# Patient Record
Sex: FEMALE | Race: WHITE | NOT HISPANIC OR LATINO | Employment: FULL TIME | URBAN - METROPOLITAN AREA
[De-identification: names, ages, dates, MRNs, and addresses within clinical notes are randomized per-mention and may not be internally consistent; named-entity substitution may affect disease eponyms.]

---

## 2024-03-20 ENCOUNTER — APPOINTMENT (OUTPATIENT)
Dept: OBSTETRICS AND GYNECOLOGY | Facility: CLINIC | Age: 24
End: 2024-03-20
Payer: COMMERCIAL

## 2024-03-22 ENCOUNTER — OFFICE VISIT (OUTPATIENT)
Dept: OBSTETRICS AND GYNECOLOGY | Facility: CLINIC | Age: 24
End: 2024-03-22
Payer: COMMERCIAL

## 2024-03-22 VITALS
SYSTOLIC BLOOD PRESSURE: 116 MMHG | WEIGHT: 180.8 LBS | HEIGHT: 64 IN | DIASTOLIC BLOOD PRESSURE: 72 MMHG | BODY MASS INDEX: 30.87 KG/M2

## 2024-03-22 DIAGNOSIS — Z01.419 WELL FEMALE EXAM WITH ROUTINE GYNECOLOGICAL EXAM: Primary | ICD-10-CM

## 2024-03-22 PROCEDURE — 88175 CYTOPATH C/V AUTO FLUID REDO: CPT

## 2024-03-22 PROCEDURE — 1036F TOBACCO NON-USER: CPT | Performed by: OBSTETRICS & GYNECOLOGY

## 2024-03-22 PROCEDURE — 99385 PREV VISIT NEW AGE 18-39: CPT | Performed by: OBSTETRICS & GYNECOLOGY

## 2024-03-22 ASSESSMENT — PATIENT HEALTH QUESTIONNAIRE - PHQ9
1. LITTLE INTEREST OR PLEASURE IN DOING THINGS: NOT AT ALL
2. FEELING DOWN, DEPRESSED OR HOPELESS: NOT AT ALL
SUM OF ALL RESPONSES TO PHQ9 QUESTIONS 1 & 2: 0

## 2024-03-22 NOTE — PROGRESS NOTES
"NPV, Annual Exam    Pap: 2019 nml per pt  Arsalan:  Never  LMP: 2024  CC: Trying to conceive     Simin Wilkins MA II     GYN ANNUAL EXAM    SUBJECTIVE    Farrah Gardner \"Allshandra\" is a 24 y.o. female who presents for annual exam today.  Her periods are monthly and fairly regular.  Her cycle length ranges from 27 to 31 days.  She is actively trying to conceive and has been since September.  She has no complaints today.      PMH - asthma      PSH - ACL surgery     OB history -   OB History    Para Term  AB Living   0 0 0 0 0 0   SAB IAB Ectopic Multiple Live Births   0 0 0 0 0     Last pap - unsure, denies h/o abnormal paps     Last mammogram - not yet indicated     Family history of breast or ovarian cancer - None     OBJECTIVE  /72   Ht 1.626 m (5' 4\")   Wt 82 kg (180 lb 12.8 oz)   LMP 2024 (Exact Date)   BMI 31.03 kg/m²     General Appearance   - consistent with stated age, well groomed and cooperative    Integumentary  - skin warm and dry without rash    Head and Neck  - normalocephalic and neck supple    Chest and Lung Exam  - normal breathing effort, no respiratory distress    Breast  - symmetry noted, no mass palpable, no skin change and no nipple discharge.    Abdomen  - soft, nontender and no hepatomegaly, splenomegaly, or mass    Female Genitourinary  - vulva normal without rash or lesion, normal vaginal rugae, no vaginal discharge, uterus normal size & no palpable masses, no adnexal mass, no adnexal tenderness, no cervical motion tenderness    Peripheral Vascular  - no edema present    ASSESSMENT/PLAN  24 y.o.  female who presents for annual exam.       Actions performed during this visit include:  - Clinical breast exam  - Clinical pelvic exam  - Pap- Pap sent today   - Mammogram- not yet indicated   - Contraception- Declines.  Actively trying to conceive.  Discussed start PNV and she will start.   - STI screening  - Declines     Please return for your next visit in 1 " year or sooner as needed.     Jennifer Millan MD

## 2024-04-01 LAB
CYTOLOGY CMNT CVX/VAG CYTO-IMP: NORMAL
LAB AP HPV GENOTYPE QUESTION: NO
LAB AP HPV HR: NORMAL
LABORATORY COMMENT REPORT: NORMAL
PATH REPORT.TOTAL CANCER: NORMAL

## 2024-07-31 ENCOUNTER — OFFICE VISIT (OUTPATIENT)
Dept: OBSTETRICS AND GYNECOLOGY | Facility: CLINIC | Age: 24
End: 2024-07-31
Payer: COMMERCIAL

## 2024-07-31 VITALS — DIASTOLIC BLOOD PRESSURE: 83 MMHG | BODY MASS INDEX: 30.07 KG/M2 | WEIGHT: 175.2 LBS | SYSTOLIC BLOOD PRESSURE: 126 MMHG

## 2024-07-31 DIAGNOSIS — Z78.9 ATTEMPTING TO CONCEIVE: Primary | ICD-10-CM

## 2024-07-31 PROCEDURE — 1036F TOBACCO NON-USER: CPT

## 2024-07-31 PROCEDURE — 99214 OFFICE O/P EST MOD 30 MIN: CPT

## 2024-07-31 NOTE — PROGRESS NOTES
"Subjective   Patient ID: Farrah Gardner \"Angela\" is a 24 y.o. female who presents for attempting to conceive (Has been trying for 1 year to conceive. Aug is one year trying./Tracking ovulation:  no, per KK sex every day 1-10 after period, then every other til ovulation/LMP: 6/27/24, 3-4 days long/Cycles: normal til 2 months ago/Bleeding lasting for 3-4 days/Hcg test at home this morning- neg/Pap: 3/22/2024 nml).    HPI  Patient presents to the office today with concern for infertility.  States that she and her  have been trying to get pregnancy since September of 2023.  Menstrual cycles are 29-36 days.  She uses Rick armen.  Having intercourse daily from day 4-10 of her cycle, and then every other day from day 10-14, and then stops having intercourse.  Taking daily PNV.      States that her mother had a history of endometriosis.    Having anxiety that there may be a problem with either her or her .    Currently 3 days late for period, though has had multiple negative at home pregnancy tests.  Negative test this AM.     Review of Systems   All other systems reviewed and are negative.      Objective   Physical Exam  Constitutional:       Appearance: Normal appearance.   HENT:      Head: Normocephalic.   Pulmonary:      Effort: Pulmonary effort is normal.   Skin:     General: Skin is warm and dry.   Neurological:      Mental Status: She is alert and oriented to person, place, and time.   Psychiatric:         Mood and Affect: Mood normal.         Assessment/Plan   Diagnoses and all orders for this visit:  Attempting to conceive  -     Referral to Reproductive Endocrinology; Future    Discussed menstrual cycle and timing of intercourse; recommended intercourse every other day from day 9-21 based on cycle length.  Also recommended Vitamin D supplementation.  DAMASO referral placed.    Patient reports understanding, all questions answered at this time.    F/U as needed.     EVANS Elder-DAMON 07/31/24 " 2:40 PM

## 2024-08-28 ENCOUNTER — APPOINTMENT (OUTPATIENT)
Dept: OBSTETRICS AND GYNECOLOGY | Facility: CLINIC | Age: 24
End: 2024-08-28
Payer: COMMERCIAL

## 2024-08-29 ENCOUNTER — APPOINTMENT (OUTPATIENT)
Dept: OBSTETRICS AND GYNECOLOGY | Facility: CLINIC | Age: 24
End: 2024-08-29
Payer: COMMERCIAL

## 2024-10-11 ENCOUNTER — PATIENT MESSAGE (OUTPATIENT)
Dept: ENDOCRINOLOGY | Facility: CLINIC | Age: 24
End: 2024-10-11
Payer: COMMERCIAL

## 2024-10-13 ASSESSMENT — LIFESTYLE VARIABLES
TOBACCO_USE: NO
HISTORY_ALCOHOL_USE: NO
TOBACCO_USE: NO
HISTORY_ALCOHOL_USE: NO

## 2024-10-15 ENCOUNTER — CONSULT (OUTPATIENT)
Dept: ENDOCRINOLOGY | Facility: CLINIC | Age: 24
End: 2024-10-15
Payer: COMMERCIAL

## 2024-10-15 DIAGNOSIS — Z11.59 ENCOUNTER FOR SCREENING FOR OTHER VIRAL DISEASES: ICD-10-CM

## 2024-10-15 DIAGNOSIS — Z01.812 ENCOUNTER FOR PREPROCEDURAL LABORATORY EXAMINATION: ICD-10-CM

## 2024-10-15 DIAGNOSIS — Z01.83 ENCOUNTER FOR RH BLOOD TYPING: ICD-10-CM

## 2024-10-15 DIAGNOSIS — Z31.41 FERTILITY TESTING: Primary | ICD-10-CM

## 2024-10-15 DIAGNOSIS — Z11.3 SCREENING FOR STDS (SEXUALLY TRANSMITTED DISEASES): ICD-10-CM

## 2024-10-15 DIAGNOSIS — Z78.9 ATTEMPTING TO CONCEIVE: ICD-10-CM

## 2024-10-15 PROCEDURE — 99214 OFFICE O/P EST MOD 30 MIN: CPT | Performed by: OBSTETRICS & GYNECOLOGY

## 2024-10-15 RX ORDER — ACETAMINOPHEN 500 MG
2000 TABLET ORAL DAILY
COMMUNITY

## 2024-10-15 RX ORDER — ACETAMINOPHEN AND PHENYLEPHRINE HCL 325; 5 MG/1; MG/1
10000 TABLET ORAL
COMMUNITY

## 2024-10-15 ASSESSMENT — PATIENT HEALTH QUESTIONNAIRE - PHQ9
2. FEELING DOWN, DEPRESSED OR HOPELESS: NOT AT ALL
1. LITTLE INTEREST OR PLEASURE IN DOING THINGS: NOT AT ALL
SUM OF ALL RESPONSES TO PHQ9 QUESTIONS 1 AND 2: 0

## 2024-10-15 ASSESSMENT — COLUMBIA-SUICIDE SEVERITY RATING SCALE - C-SSRS
6. HAVE YOU EVER DONE ANYTHING, STARTED TO DO ANYTHING, OR PREPARED TO DO ANYTHING TO END YOUR LIFE?: NO
1. IN THE PAST MONTH, HAVE YOU WISHED YOU WERE DEAD OR WISHED YOU COULD GO TO SLEEP AND NOT WAKE UP?: NO
2. HAVE YOU ACTUALLY HAD ANY THOUGHTS OF KILLING YOURSELF?: NO

## 2024-10-15 NOTE — PROGRESS NOTES
"  Visit Type: In Person    NEW FERTILITY PATIENT VISIT    Referred by: Margaret hoskins    Accompanied today by: Soto Gardner-        Farrah Gardner \"Angela\" is a 24 y.o.  female who presents with primary infertility x 1 year.    Have you had any concerns about your fertility treatments so far? No, we are just starting our process     What are you goals for today's visit? To get a better understanding of what is happening with our process and to get a better understanding of the process     What causes of infertility have been identified on your workup so far? We are both 24 and have been trying for now over a year with no positive pregnancy test in that time. I have family history of endometriosis and ovarian cancer.     Past Infertility Treatments: No      Please summarize your fertility treatments to date.       As far as you are aware, do you have insurance coverage for fertility diagnostic testing and/or fertility treatments? Yes      PRIOR EVALUATION / TREATMENT    Relationship Status:      Have you ever been pregnant? No    How many times have you been pregnant?    Have you ever had a miscarriage? No    How many times have you had a miscarriage?       OB Hx     OB History          0    Para   0    Term   0       0    AB   0    Living   0         SAB   0    IAB   0    Ectopic   0    Multiple   0    Live Births   0                 GYN HISTORY   Have you ever been diagnosed with a sexually transmitted disease? No    Please select all that are applicable:    Have you ever had Pelvic Inflammatory Disease? No    Have you had an abnormal PAP smear? No    Date & Result of last PAP smear:   Lab Results   Component Value Date    FINALINTERP  2024         A. THINPREP PAP CERVIX, SCREENING -     Specimen Adequacy  Satisfactory for evaluation; endocervical/transformation zone component is present  Quality Indicator: Partially obscuring inflammation    General Categorization  Negative for " intraepithelial lesion or malignancy.    Descriptive Interpretation  Negative for intraepithelial lesion or malignancy      Specimen does not meet the requisition-stated criteria for HPV testing. See Pap test interpretation above.          Have you ever had an abnormal Mammogram? No    Date & result of your last mammogram:    Do you have pelvic pain? No    How many times per week do you have intercourse? We are having sex every other day from day 9 to day 22    Do you have pain with intercourse? No    Do you use lubricants with intercourse? None    Do you have pain with bowel movements? No              Do you have pain with a full bladder? No    MENSTRUAL HISTORY  LMP: Other    Menarche: 12-13 years old    Contraception: None    Cycle length: 30    Describe your bleeding: Average    Dysmenorrhea: Yes       ENDOCRINE/INFERTILITY HISTORY  Duration of infertility: 1 year    Coital Activity/week: We are having sex every other day from day 9 to day 22    Nipple Discharge: No    Vision changes: No    Headaches: No    Excess hair growth: No    Excessive hair loss: Yes    Acne: Yes    Oily skin: Yes    Recent weight change  Weight gain: No    Weight loss: No    Exercise more than 3 times a week: No      PMH  Past Medical History:   Diagnosis Date    Asthma         MEDICATIONS  Current Outpatient Medications on File Prior to Visit   Medication Sig Dispense Refill    biotin 10,000 mcg capsule Take 10,000 capsules (100,000 mg) by mouth.      cholecalciferol (Vitamin D3) 50 mcg (2,000 unit) capsule Take 1 capsule (50 mcg) by mouth once daily.      prenatal vit37/iron/folic acid (PRENATA ORAL) Take by mouth.       No current facility-administered medications on file prior to visit.        PSH  Past Surgical History:   Procedure Laterality Date    ANTERIOR CRUCIATE LIGAMENT REPAIR Right     KNEE OPEN LATERAL RELEASE Right         PSYCH HISTORY  Have you ever been diagnosed with a mental health Issue? No    Have you ever been  hospitalized for a mental health disorder? No       SOCIAL HISTORY  Social History     Tobacco Use    Smoking status: Never     Passive exposure: Never    Smokeless tobacco: Never   Vaping Use    Vaping status: Never Used   Substance Use Topics    Alcohol use: Yes     Alcohol/week: 2.0 standard drinks of alcohol     Types: 1 Glasses of wine, 1 Standard drinks or equivalent per week     Comment: More per month    Drug use: Never     Occupation:      Have you ever been incarcerated? No    Do you have a history of domestic violence? No    Do you feel safe at home? Yes    Do you have a history of any negative sexual experience such as incest or rape? No       PARTNER HISTORY  Partner Name: Soto Gardner    Partner : [id number]    Partner email: [email or web address]    Occupation: Supply chain     Prior fertility history: None    PMH: UTI-    PSH: Reconstructed ear drum:     Smoking:No    Alcohol Use: No    Drug Use: No    Medications: N/a    Injuries: Yes    STD: No    Please select all that are applicable:    SA: No    SA Results:      FAMILY HISTORY  Family History   Problem Relation Name Age of Onset    Endometriosis Mother      Other (cervix cancer) Maternal Grandmother         CANCER HISTORY    Breast: Unsure    Ovarian: No    Colon: No    Endometrial: No      FAMILY VTE HISTORY  Family History of Blood Clots: No      GENETIC HISTORY  Ethnic Background  Patient: White    Partner: White    Genetic Disease in Family  Patient: No    Partner: No    Birth Defects in Family  Patient: No    Partner: No    Genetic screening performed previously:       BMI:   BMI Readings from Last 1 Encounters:   24 30.07 kg/m²     VITALS:  There were no vitals taken for this visit.    GEN: alert and oriented, cooperative, no distress, appears stated age  Psych:  Exhibits appropriate mood and judgement.  HEENT: NC/AT  Resp: Normal respiratory effort, no use of accessory  muscles  Ext: No clubbing, cyanosis, or edema      ASSESSMENT   Farrah is a 24 y.o.  female with  primary infertility x 1 year.  She desires to proceed with fertility testing.    Partner SA:  ordered    COUNSELING  We discussed causes of infertility including hormonal, egg quality issues, structural problems such as endometriosis, adhesions, or tubal problems, uterine factors such as polyps or fibroids, and sperm issues. Reviewed evaluation of such as well. We discussed various methods for achieving pregnancy in some detail including, ovulation induction, insemination, superovulation and IVF.        Routine Testing  Fertility Center  STDs Within 1 year   Genetic carrier Waiver/Completed   T&S Within 1 year   AMH Within 1 year   TSH Within 1 year   Rubella/Varicella Within 5 years     PLAN  Orders Placed This Encounter   Procedures    Hysterosalpingogram (HSG)    US pelvis transvaginal    FL hysterosalpingogram    Antimullerian Hormone (Amh)    TSH with reflex to Free T4 if abnormal    Type And Screen    Rubella Antibody, Igg    Varicella Zoster Antibody, Igg    Hepatitis B surface antigen    Hepatitis C Antibody    HIV 1/2 Antigen/Antibody Screen with Reflex to Confirmation    Syphilis Screen with Reflex    POCT pregnancy, urine manually resulted       GENETIC SCREENING PATIENT  Will consider    PARTNER  Yes Semen Analysis: Ordered  Yes Genetic screening: will consider    FOLLOW UP   Consults: Financial consult  Chart to primary nurse for care coordination and patient check list/education  Enroll in Engaged MD  Take prenatal vitamins, vitamin D 2000 IUs daily  Discussed that pap and mammogram must be updated per ACOG guidelines before treatment can begin  Discussed that treatment cannot proceed until checklist items are complete   6 week follow up with Any provider  Additional testing for BMI < 18 or > 40: NA  Sperm Donor:      MD Completion:  Ectopic Risk: No  Medically Complex: No    Fertility Plan  Update: complete testing, follow up to discuss options    Arden Morrell  10/15/2024  8:14 AM

## 2024-10-15 NOTE — LETTER
"October 15, 2024     [facility]  [address]  [address]  [facility] [address]    Patient: Angela Gardner   YOB: 2000   Date of Visit: 10/15/2024       Dear ARI Graves:    Thank you for referring Angela Gardner to me for evaluation. Below are my notes for this consultation.  If you have questions, please do not hesitate to call me. I look forward to following your patient along with you.       Sincerely,     Arden Morrell MD      CC: No Recipients  ______________________________________________________________________________________      Visit Type: In Person    NEW FERTILITY PATIENT VISIT    Referred by: Margaret hoskins    Accompanied today by: Soto Jj-        Farrah Gardner \"Vadim" is a 24 y.o.  female who presents with primary infertility x 1 year.    Have you had any concerns about your fertility treatments so far? No, we are just starting our process     What are you goals for today's visit? To get a better understanding of what is happening with our process and to get a better understanding of the process     What causes of infertility have been identified on your workup so far? We are both 24 and have been trying for now over a year with no positive pregnancy test in that time. I have family history of endometriosis and ovarian cancer.     Past Infertility Treatments: No      Please summarize your fertility treatments to date.       As far as you are aware, do you have insurance coverage for fertility diagnostic testing and/or fertility treatments? Yes      PRIOR EVALUATION / TREATMENT    Relationship Status:      Have you ever been pregnant? No    How many times have you been pregnant?    Have you ever had a miscarriage? No    How many times have you had a miscarriage?       OB Hx     OB History          0    Para   0    Term   0       0    AB   0    Living   0         SAB   0    IAB   0    Ectopic   0    Multiple "   0    Live Births   0                 GYN HISTORY   Have you ever been diagnosed with a sexually transmitted disease? No    Please select all that are applicable:    Have you ever had Pelvic Inflammatory Disease? No    Have you had an abnormal PAP smear? No    Date & Result of last PAP smear:   Lab Results   Component Value Date    FINALINTERP  03/22/2024         A. THINPREP PAP CERVIX, SCREENING -     Specimen Adequacy  Satisfactory for evaluation; endocervical/transformation zone component is present  Quality Indicator: Partially obscuring inflammation    General Categorization  Negative for intraepithelial lesion or malignancy.    Descriptive Interpretation  Negative for intraepithelial lesion or malignancy      Specimen does not meet the requisition-stated criteria for HPV testing. See Pap test interpretation above.          Have you ever had an abnormal Mammogram? No    Date & result of your last mammogram:    Do you have pelvic pain? No    How many times per week do you have intercourse? We are having sex every other day from day 9 to day 22    Do you have pain with intercourse? No    Do you use lubricants with intercourse? None    Do you have pain with bowel movements? No              Do you have pain with a full bladder? No    MENSTRUAL HISTORY  LMP: Other    Menarche: 12-13 years old    Contraception: None    Cycle length: 30    Describe your bleeding: Average    Dysmenorrhea: Yes       ENDOCRINE/INFERTILITY HISTORY  Duration of infertility: 1 year    Coital Activity/week: We are having sex every other day from day 9 to day 22    Nipple Discharge: No    Vision changes: No    Headaches: No    Excess hair growth: No    Excessive hair loss: Yes    Acne: Yes    Oily skin: Yes    Recent weight change  Weight gain: No    Weight loss: No    Exercise more than 3 times a week: No      PMH  Past Medical History:   Diagnosis Date   • Asthma         MEDICATIONS  Current Outpatient Medications on File Prior to Visit    Medication Sig Dispense Refill   • biotin 10,000 mcg capsule Take 10,000 capsules (100,000 mg) by mouth.     • cholecalciferol (Vitamin D3) 50 mcg (2,000 unit) capsule Take 1 capsule (50 mcg) by mouth once daily.     • prenatal vit37/iron/folic acid (PRENATA ORAL) Take by mouth.       No current facility-administered medications on file prior to visit.        PSH  Past Surgical History:   Procedure Laterality Date   • ANTERIOR CRUCIATE LIGAMENT REPAIR Right    • KNEE OPEN LATERAL RELEASE Right         PSYCH HISTORY  Have you ever been diagnosed with a mental health Issue? No    Have you ever been hospitalized for a mental health disorder? No       SOCIAL HISTORY  Social History     Tobacco Use   • Smoking status: Never     Passive exposure: Never   • Smokeless tobacco: Never   Vaping Use   • Vaping status: Never Used   Substance Use Topics   • Alcohol use: Yes     Alcohol/week: 2.0 standard drinks of alcohol     Types: 1 Glasses of wine, 1 Standard drinks or equivalent per week     Comment: More per month   • Drug use: Never     Occupation:      Have you ever been incarcerated? No    Do you have a history of domestic violence? No    Do you feel safe at home? Yes    Do you have a history of any negative sexual experience such as incest or rape? No       PARTNER HISTORY  Partner Name: Soto Gardner    Partner : [id number]    Partner email: [email or web address]    Occupation: Supply chain     Prior fertility history: None    PMH: UTI-    PSH: Reconstructed ear drum:     Smoking:No    Alcohol Use: No    Drug Use: No    Medications: N/a    Injuries: Yes    STD: No    Please select all that are applicable:    SA: No    SA Results:      FAMILY HISTORY  Family History   Problem Relation Name Age of Onset   • Endometriosis Mother     • Other (cervix cancer) Maternal Grandmother         CANCER HISTORY    Breast: Unsure    Ovarian: No    Colon: No    Endometrial:  No      FAMILY VTE HISTORY  Family History of Blood Clots: No      GENETIC HISTORY  Ethnic Background  Patient: White    Partner: White    Genetic Disease in Family  Patient: No    Partner: No    Birth Defects in Family  Patient: No    Partner: No    Genetic screening performed previously:       BMI:   BMI Readings from Last 1 Encounters:   24 30.07 kg/m²     VITALS:  There were no vitals taken for this visit.    GEN: alert and oriented, cooperative, no distress, appears stated age  Psych:  Exhibits appropriate mood and judgement.  HEENT: NC/AT  Resp: Normal respiratory effort, no use of accessory muscles  Ext: No clubbing, cyanosis, or edema      ASSESSMENT   Farrah is a 24 y.o.  female with  primary infertility x 1 year.  She desires to proceed with fertility testing.    Partner SA:  ordered    COUNSELING  We discussed causes of infertility including hormonal, egg quality issues, structural problems such as endometriosis, adhesions, or tubal problems, uterine factors such as polyps or fibroids, and sperm issues. Reviewed evaluation of such as well. We discussed various methods for achieving pregnancy in some detail including, ovulation induction, insemination, superovulation and IVF.        Routine Testing  Fertility Center  STDs Within 1 year   Genetic carrier Waiver/Completed   T&S Within 1 year   AMH Within 1 year   TSH Within 1 year   Rubella/Varicella Within 5 years     PLAN  Orders Placed This Encounter   Procedures   • Hysterosalpingogram (HSG)   • US pelvis transvaginal   • FL hysterosalpingogram   • Antimullerian Hormone (Amh)   • TSH with reflex to Free T4 if abnormal   • Type And Screen   • Rubella Antibody, Igg   • Varicella Zoster Antibody, Igg   • Hepatitis B surface antigen   • Hepatitis C Antibody   • HIV 1/2 Antigen/Antibody Screen with Reflex to Confirmation   • Syphilis Screen with Reflex   • POCT pregnancy, urine manually resulted       GENETIC SCREENING PATIENT  Will  consider    PARTNER  Yes Semen Analysis: Ordered  Yes Genetic screening: will consider    FOLLOW UP   Consults: Financial consult  Chart to primary nurse for care coordination and patient check list/education  Enroll in Engaged MD  Take prenatal vitamins, vitamin D 2000 IUs daily  Discussed that pap and mammogram must be updated per ACOG guidelines before treatment can begin  Discussed that treatment cannot proceed until checklist items are complete   6 week follow up with Any provider  Additional testing for BMI < 18 or > 40: NA  Sperm Donor:      MD Completion:  Ectopic Risk: No  Medically Complex: No    Fertility Plan Update: complete testing, follow up to discuss options    Arden Morrell  10/15/2024  8:14 AM

## 2024-11-07 ENCOUNTER — LAB (OUTPATIENT)
Dept: LAB | Facility: LAB | Age: 24
End: 2024-11-07
Payer: COMMERCIAL

## 2024-11-07 ENCOUNTER — ANCILLARY PROCEDURE (OUTPATIENT)
Dept: ENDOCRINOLOGY | Facility: CLINIC | Age: 24
End: 2024-11-07
Payer: COMMERCIAL

## 2024-11-07 DIAGNOSIS — Z31.41 FERTILITY TESTING: ICD-10-CM

## 2024-11-07 DIAGNOSIS — Z11.59 ENCOUNTER FOR SCREENING FOR OTHER VIRAL DISEASES: ICD-10-CM

## 2024-11-07 DIAGNOSIS — Z11.3 SCREENING FOR STDS (SEXUALLY TRANSMITTED DISEASES): ICD-10-CM

## 2024-11-07 DIAGNOSIS — Z01.83 ENCOUNTER FOR RH BLOOD TYPING: ICD-10-CM

## 2024-11-07 LAB
ABO GROUP (TYPE) IN BLOOD: NORMAL
ANTIBODY SCREEN: NORMAL
HBV SURFACE AG SERPL QL IA: NONREACTIVE
HCV AB SER QL: NONREACTIVE
HIV 1+2 AB+HIV1 P24 AG SERPL QL IA: NONREACTIVE
RH FACTOR (ANTIGEN D): NORMAL
RUBV IGG SERPL IA-ACNC: 2.6 IA
RUBV IGG SERPL QL IA: POSITIVE
TREPONEMA PALLIDUM IGG+IGM AB [PRESENCE] IN SERUM OR PLASMA BY IMMUNOASSAY: NONREACTIVE
TSH SERPL-ACNC: 1.52 MIU/L (ref 0.44–3.98)
VARICELLA ZOSTER IGG INDEX: 0.3 IA
VZV IGG SER QL IA: NEGATIVE

## 2024-11-07 PROCEDURE — 87389 HIV-1 AG W/HIV-1&-2 AB AG IA: CPT

## 2024-11-07 PROCEDURE — 76830 TRANSVAGINAL US NON-OB: CPT

## 2024-11-07 PROCEDURE — 86803 HEPATITIS C AB TEST: CPT

## 2024-11-07 PROCEDURE — 83516 IMMUNOASSAY NONANTIBODY: CPT

## 2024-11-07 PROCEDURE — 86787 VARICELLA-ZOSTER ANTIBODY: CPT

## 2024-11-07 PROCEDURE — 36415 COLL VENOUS BLD VENIPUNCTURE: CPT

## 2024-11-07 PROCEDURE — 86317 IMMUNOASSAY INFECTIOUS AGENT: CPT

## 2024-11-07 PROCEDURE — 86900 BLOOD TYPING SEROLOGIC ABO: CPT

## 2024-11-07 PROCEDURE — 76830 TRANSVAGINAL US NON-OB: CPT | Performed by: STUDENT IN AN ORGANIZED HEALTH CARE EDUCATION/TRAINING PROGRAM

## 2024-11-07 PROCEDURE — 86901 BLOOD TYPING SEROLOGIC RH(D): CPT

## 2024-11-07 PROCEDURE — 87340 HEPATITIS B SURFACE AG IA: CPT

## 2024-11-07 PROCEDURE — 84443 ASSAY THYROID STIM HORMONE: CPT

## 2024-11-07 PROCEDURE — 86850 RBC ANTIBODY SCREEN: CPT

## 2024-11-07 PROCEDURE — 86780 TREPONEMA PALLIDUM: CPT

## 2024-11-11 LAB — MIS SERPL-MCNC: 11.26 NG/ML (ref 0.4–16.02)

## 2024-11-27 ENCOUNTER — TELEMEDICINE (OUTPATIENT)
Dept: ENDOCRINOLOGY | Facility: CLINIC | Age: 24
End: 2024-11-27
Payer: COMMERCIAL

## 2024-11-27 VITALS — WEIGHT: 175 LBS | HEIGHT: 64 IN | BODY MASS INDEX: 29.88 KG/M2

## 2024-11-27 DIAGNOSIS — Z31.41 FERTILITY TESTING: Primary | ICD-10-CM

## 2024-11-27 PROCEDURE — 3008F BODY MASS INDEX DOCD: CPT | Performed by: OBSTETRICS & GYNECOLOGY

## 2024-11-27 PROCEDURE — 1036F TOBACCO NON-USER: CPT | Performed by: OBSTETRICS & GYNECOLOGY

## 2024-11-27 PROCEDURE — 99214 OFFICE O/P EST MOD 30 MIN: CPT | Performed by: OBSTETRICS & GYNECOLOGY

## 2024-11-27 ASSESSMENT — PAIN SCALES - GENERAL: PAINLEVEL_OUTOF10: 0-NO PAIN

## 2024-11-27 NOTE — PROGRESS NOTES
Virtual or Telephone Consent: An interactive audio and video telecommunication system which permits real time communications between the patient (at the originating site) and provider (at the distant site) was utilized to provide this telehealth service    Follow Up Visit JULIANE Yan is a 24 y.o.  female presenting today for follow up visit.  Results of the patient's fertility testing are as seen below.    Testing to date:   Component      Latest Ref Rng 2024   ABO TYPE B    Rh Type POS    ANTIBODY SCREEN NEG    Rubella, IgG      Negative  Positive    Rubella, IgG Index      <=0.7 IA IA 2.6    Varicella Zoster, IgG      Negative  Negative    Varicella Zoster, IgG Index      <=0.8 IA 0.3    Anti-Mullerian Hormone      0.401 - 16.015 ng/mL 11.263    Thyroid Stimulating Hormone      0.44 - 3.98 mIU/L 1.52    Hepatitis B Surface AG      Nonreactive  Nonreactive    Hepatitis C AB      Nonreactive  Nonreactive    HIV 1/2 Antigen/Antibody Screen with Reflex to Confirmation      Nonreactive  Nonreactive    Syphilis Total Ab      Nonreactive  Nonreactive      HSG has not yet been completed    This couple would like to hold off on genetic testing, declined testing at this time    GYN Pelvic Ultrasound: US PELVIS TRANSVAGINAL (2024): Unremarkable pelvic survey. The ovaries are noted to have numerous small physiologic peripheral follicles, consistent with possible polycystic ovarian syndrome.  Transabdominal images were acquired in addition to transvaginal images for optimal visualization of pelvic structures.    Partner SA:  Soto Gardner 98  Component      Latest Ref Rng 10/18/2024   Volume (Semen)      >=1.5 mL 2.90    Concentration(Semen)      >=15 mill/mL 59.85    Total Motility (Semen)      >=40 % 38 !    Prog. Motility (Semen)      >=32 % 31 !    Non Prog. Motility (Semen)      % 8    Total No of Sperm (Semen)      >=39 mill 173.57    Total No of Motile (Semen)      mill 66.55    Total No of  "Rnd Cells (Semen)      <=5 mill 2.6    Leukocyte (Semen) Negative See Separate Report    % Normal (Semen)      >=4 % 3.5 !           Past Medical History:   Diagnosis Date    Asthma      Past Surgical History:   Procedure Laterality Date    ANTERIOR CRUCIATE LIGAMENT REPAIR Right     KNEE OPEN LATERAL RELEASE Right      Current Outpatient Medications on File Prior to Visit   Medication Sig Dispense Refill    biotin 10,000 mcg capsule Take 10,000 capsules (100,000 mg) by mouth.      cholecalciferol (Vitamin D3) 50 mcg (2,000 unit) capsule Take 1 capsule (50 mcg) by mouth once daily.      prenatal vit37/iron/folic acid (PRENATA ORAL) Take by mouth.       No current facility-administered medications on file prior to visit.       BMI:   BMI Readings from Last 1 Encounters:   24 30.04 kg/m²     VITALS:  Ht 1.626 m (5' 4\")   Wt 79.4 kg (175 lb)   LMP 10/30/2024 (Exact Date)   BMI 30.04 kg/m²   LMP: Patient's last menstrual period was 10/30/2024 (exact date).    This is a telehealth visit    ASSESSMENT   Farrah is a 24 y.o.  female who presents today for follow-up of fertility testing.  Results of this couples testing were discussed at length, and all questions were answered to their satisfaction.  Unfortunately the patient was unable to complete HSG due to available testing slots, and will contact our office with her next menstrual period to complete this test.  Once HSG is complete, this couple is to follow-up with me to discuss options and next steps.  We briefly discussed possibility of proceeding with ovulation induction/IUI.  Patient reports that ovulation predictor kits are not reliable for her, therefore we will likely plan on OI/US/hCG/IUI if tubes are found to be patent.      Routine Testing  Fertility Center  STDs Within 1 year   Genetic carrier Waiver/Completed   T&S Within 1 year   AMH Within 1 year   TSH Within 1 year   Rubella/Varicella Within 5 years     BMI Testing  Fertility " Center  CBC Within 1 year   CMP Within 1 year   HgbA1c Within 1 year   Mag, Phos, Vit D <18 Within 1 year   MFM > 40  REQ   Wt loss consult > 40 OPT     PLAN  [X ] Patient to complete HSG, follow-up to discuss options and next steps      FOLLOW UP   Consults: Financial consult  Engaged MD  Take prenatal vitamins, vitamin D 2000 IUs daily  Discussed that treatment cannot proceed until checklist items are complete.   Additional testing for BMI < 18 or > 40: NA.  Follow-up once workup is complete  Chart to primary nurse for care coordination and patient check list/education.      Arden Morrell  11/27/2024  11:38 AM

## 2024-12-09 ENCOUNTER — TELEPHONE (OUTPATIENT)
Dept: ENDOCRINOLOGY | Facility: CLINIC | Age: 24
End: 2024-12-09

## 2024-12-09 ENCOUNTER — HOSPITAL ENCOUNTER (OUTPATIENT)
Dept: RADIOLOGY | Facility: HOSPITAL | Age: 24
Discharge: HOME | End: 2024-12-09
Payer: COMMERCIAL

## 2024-12-09 ENCOUNTER — ANCILLARY PROCEDURE (OUTPATIENT)
Dept: ENDOCRINOLOGY | Facility: CLINIC | Age: 24
End: 2024-12-09
Payer: COMMERCIAL

## 2024-12-09 DIAGNOSIS — Z11.3 SCREEN FOR STD (SEXUALLY TRANSMITTED DISEASE): Primary | ICD-10-CM

## 2024-12-09 DIAGNOSIS — Z01.812 ENCOUNTER FOR PREPROCEDURAL LABORATORY EXAMINATION: ICD-10-CM

## 2024-12-09 DIAGNOSIS — Z31.41 FERTILITY TESTING: ICD-10-CM

## 2024-12-09 LAB — PREGNANCY TEST URINE, POC: NEGATIVE

## 2024-12-09 PROCEDURE — 2550000001 HC RX 255 CONTRASTS: Performed by: OBSTETRICS & GYNECOLOGY

## 2024-12-09 PROCEDURE — 58340 CATHETER FOR HYSTEROGRAPHY: CPT | Performed by: NURSE PRACTITIONER

## 2024-12-09 PROCEDURE — 74740 X-RAY FEMALE GENITAL TRACT: CPT

## 2024-12-09 NOTE — TELEPHONE ENCOUNTER
Caller: Angela  Reason for call: questions regarding next steps  Notes: She has a VFUV scheduled with Dr Morrell in February she is calling to talk to a nurse about her next steps.

## 2024-12-09 NOTE — PROCEDURES
Hysterosalpingogram (HSG)    Date/Time: 12/9/2024 7:51 AM    Performed by: SANDRA Proctor  Authorized by: SANDRA Proctor    Consent:     Consent obtained:  Verbal and written    Consent given by:  Patient    Risks, benefits, and alternatives were discussed: yes      Risks discussed:  Bleeding, infection and pain    Alternatives discussed:  No treatment  Universal protocol:     Procedure explained and questions answered to patient or proxy's satisfaction: yes      Relevant documents present and verified: yes      Test results available: yes      Imaging studies available: yes      Required blood products, implants, devices, and special equipment available: yes      Site/side marked: no      Immediately prior to procedure, a time out was called: yes      Patient identity confirmed:  Verbally with patient, arm band and hospital-assigned identification number  Indications:     Indications:  Fertility testing  Pre-procedure details:     Skin preparation:  Povidone-iodine  Sedation:     Sedation type:  None  Anesthesia:     Anesthesia method:  None  Procedure specific details:      Done by this GIANNI      Hysterosalpingogram (HSG) risks, benefits, alternatives, and personnel discussed with patient who agreed to proceed.    Procedural time out        Done in room where procedure done: Yes        Done just before starting procedure: Yes                                                 All members of procedural team involved in time-out: Yes                  Active communication used: Yes                                                         All team members agreed on procedure: Yes                                        Patient correctly identified by two identifiers: Yes                                  Correct side and site identified: Yes                                                     All needed special equipment/instruments available: Yes               Prior to the start of the procedure a time out  was taken and the following were verified: The identity of the patient using two patient identifiers.   Urine pregnancy test was performed and was negative.   Risks, benefits, and alternatives of the procedure were explained to the patient.  Informed consent was obtained.     The patient was placed in the dorsal lithotomy position and a sterile speculum was placed in the vagina. The cervix was sterilized with Betadine x 3. The anterior lip of the cervix was grasped with a single-tooth tenaculum. The (balloon/acorn) cannula was then placed in the cervix and secured to the tenaculum. The patient was positioned and images were taken with fluoroscopy as dye was inserted through the cannula. All instruments were then removed. The patient tolerated the procedure well and was discharged home the same day without complications.    Uterus:  normal contour without filling defects (balloon catheter was used)  Tubes:  bilateral patency with free spill of dye, normal tubal architecture noted, and no loculations present.  Based on these findings, my recommendation is: No further follow up required.    The patient was counseled regarding the above findings and images were reviewed with patient at the time of the exam.     Grazyna Izaguirre 12/09/24 7:52 AM        Post-procedure details:     Procedure completion:  Tolerated well, no immediate complications

## 2024-12-09 NOTE — TELEPHONE ENCOUNTER
Returned patient call. Patient would like to start oral medications until having a IUI. Informed patient she will need to complete her FUV with  and have a VBPV once all items have been completed on checklist. I informed patient about One Month and offered to give number to contact [facility] about pricing. Patient did not want to make a decision today on if she will sharron to do testing. I informed patient in the event she does op into having testing done she will have to wait for results before continuing with medication/ procedure. I informed patient that finical consult is a item on her checklist. As I prepared her BP I noticed no GC/T labs are in. I have pended those orders over to [facility]. Patient call was disconnected.   carolin curtis 12/09/24 11:35 AM

## 2024-12-12 ENCOUNTER — TELEPHONE (OUTPATIENT)
Dept: ENDOCRINOLOGY | Facility: CLINIC | Age: 24
End: 2024-12-12
Payer: COMMERCIAL

## 2024-12-12 NOTE — TELEPHONE ENCOUNTER
Returned patient's call. Patient had original npv in Oct and was unable to get in for hsg testing prior to her follow up in November. Patient got HSG done on 12/9 with no follow up needed for hsg. Patient doesn't have follow up with Dr. Morrell until February and patient is wondering about treatment in the meantime. Patient states the plan was either to do OI with tic or IUI. Patient inquiring if she can do a letrozole or clomid tic cycle while waiting for follow up with Dr. Morrell. Patient states she also had GC/CT done with OBGYN and would like to use that as her results instead of getting an additional sample done. Fax given to pt to have the office fax results over and let her know as long as lab is within one year that will be sufficient. Message sent to Dr. Morrell to inquire about a timed intercourse cycle while awaiting follow up visit.   BARBY AVALOS on 12/12/24 at 1:23 PM.

## 2024-12-12 NOTE — TELEPHONE ENCOUNTER
Reason for call:   Notes: Patient has some questions about medication. Also she has one more std in her lab but asked can we use the results from her OBGYN.

## 2025-01-06 ENCOUNTER — APPOINTMENT (OUTPATIENT)
Dept: ENDOCRINOLOGY | Facility: CLINIC | Age: 25
End: 2025-01-06
Payer: COMMERCIAL

## 2025-01-07 ENCOUNTER — TELEPHONE (OUTPATIENT)
Dept: ENDOCRINOLOGY | Facility: CLINIC | Age: 25
End: 2025-01-07
Payer: COMMERCIAL

## 2025-01-07 NOTE — TELEPHONE ENCOUNTER
Reason for call: Call Back  Notes: Pt stated her cycle is about 1 week late but she's had negative pregnancy tests. She wanted to check if this could be due to her HSG last month.

## 2025-01-07 NOTE — TELEPHONE ENCOUNTER
Returned call to patient to address concerns. Patients cycle is currently 9 days late, she has taken pregnancy tests and they have been negative. Patient had HSG last month. Informed patient that this can be very normal after having an HSG and can throw off her cycle. Instructed patient to repeat an at home pregnancy test in one week if no period by then and if negative to contact office back. Patient verbalized understanding.  Franchesca Charles 01/07/25 11:46 AM

## 2025-01-09 ENCOUNTER — TELEPHONE (OUTPATIENT)
Dept: ENDOCRINOLOGY | Facility: CLINIC | Age: 25
End: 2025-01-09
Payer: COMMERCIAL

## 2025-01-09 NOTE — TELEPHONE ENCOUNTER
Reason for call: Call Back  Notes: Pt would like to speak with RN to discuss starting Clomid this month. She also has questions regarding the GCC ordered.

## 2025-01-09 NOTE — TELEPHONE ENCOUNTER
Returned patient's call. Patient wanted to discuss her checklist items to be able to start OI medications. Patient stated she started her period today, and is aware of remaining checklist items:   GC/CT - she will go to outpatient  lab to get this done  [facility] - they will decline at this time, and will sign consent forms that were sent to their email.   Varicella - patient was negative on titer, and is upset she is just finding out about this now. She will let us know what she decides about vaccine but aware she will need to wait 30 days to conceive after last vaccination.     Patient has FUV with Dr. Morrell in 2/2025 to discuss OI meds.   Fawn Lee 01/09/25 4:27 PM

## 2025-02-20 ENCOUNTER — APPOINTMENT (OUTPATIENT)
Dept: OBSTETRICS AND GYNECOLOGY | Facility: CLINIC | Age: 25
End: 2025-02-20
Payer: COMMERCIAL

## 2025-02-21 LAB
C TRACH RRNA SPEC QL NAA+PROBE: NOT DETECTED
N GONORRHOEA RRNA SPEC QL NAA+PROBE: NOT DETECTED
QUEST GC CT AMPLIFIED (ALWAYS MESSAGE): NORMAL

## 2025-02-26 ENCOUNTER — TELEMEDICINE (OUTPATIENT)
Dept: ENDOCRINOLOGY | Facility: CLINIC | Age: 25
End: 2025-02-26
Payer: COMMERCIAL

## 2025-02-26 VITALS — WEIGHT: 175 LBS | HEIGHT: 64 IN | BODY MASS INDEX: 29.88 KG/M2

## 2025-02-26 DIAGNOSIS — Z31.41 FERTILITY TESTING: Primary | ICD-10-CM

## 2025-02-26 PROCEDURE — 3008F BODY MASS INDEX DOCD: CPT | Performed by: OBSTETRICS & GYNECOLOGY

## 2025-02-26 PROCEDURE — 99215 OFFICE O/P EST HI 40 MIN: CPT | Performed by: OBSTETRICS & GYNECOLOGY

## 2025-02-26 ASSESSMENT — PAIN SCALES - GENERAL: PAINLEVEL_OUTOF10: 0-NO PAIN

## 2025-02-26 NOTE — Clinical Note
Patient was seen today for follow-up visit, see my note for details.  Can someone reach out to the patient to coordinate?  Also, Matilda can you reach out to financial counseling?  This patient has been desperately trying to get a hold of them for the past 3 months without success.  They still do not know any information regarding cost and coverage for IUI, IVF, etc.  Thank you

## 2025-02-26 NOTE — PROGRESS NOTES
Virtual or Telephone Consent: An interactive audio and video telecommunication system which permits real time communications between the patient (at the originating site) and provider (at the distant site) was utilized to provide this telehealth service  MD reviewed, Authorization status not noted.  Patient's partner is present on the video visit today.    Follow Up Visit JULIANE Yan is a 24 y.o.  female presenting today for follow up visit.  Results of the patient's fertility testing are as seen below.     Testing to date:   Component      Latest Ref Rng 2024   ABO TYPE B    Rh Type POS    ANTIBODY SCREEN NEG    Rubella, IgG      Negative  Positive    Rubella, IgG Index      <=0.7 IA IA 2.6    Varicella Zoster, IgG      Negative  Negative    Varicella Zoster, IgG Index      <=0.8 IA 0.3    Anti-Mullerian Hormone      0.401 - 16.015 ng/mL 11.263    Thyroid Stimulating Hormone      0.44 - 3.98 mIU/L 1.52    Hepatitis B Surface AG      Nonreactive  Nonreactive    Hepatitis C AB      Nonreactive  Nonreactive    HIV 1/2 Antigen/Antibody Screen with Reflex to Confirmation      Nonreactive  Nonreactive    Syphilis Total Ab      Nonreactive  Nonreactive      GC/Chlamydia from 25: negative    This couple would like to hold off on genetic testing, declined testing at this time.  They also elected to hold off on varicella vaccine.  Risks of primary varicella infection in pregnancy was discussed today.     GYN Pelvic Ultrasound: US PELVIS TRANSVAGINAL (2024): Unremarkable pelvic survey. The ovaries are noted to have numerous small physiologic peripheral follicles, consistent with possible polycystic ovarian syndrome.  Transabdominal images were acquired in addition to transvaginal images for optimal visualization of pelvic structures.     Hysterosalpingogram: [place] HYSTEROSALPINGOGRAM (2024):    Uterus:  normal contour without filling defects (balloon catheter was used)  Tubes:  bilateral  "patency with free spill of dye, normal tubal architecture noted, and no loculations present.    Partner SA:  Soto Gardner [birth year]  Component      Latest Ref Rng 10/18/2024   Volume (Semen)      >=1.5 mL 2.90    Concentration(Semen)      >=15 mill/mL 59.85    Total Motility (Semen)      >=40 % 38 !    Prog. Motility (Semen)      >=32 % 31 !    Non Prog. Motility (Semen)      % 8    Total No of Sperm (Semen)      >=39 mill 173.57    Total No of Motile (Semen)      mill 66.55    Total No of Rnd Cells (Semen)      <=5 mill 2.6    Leukocyte (Semen) Negative See Separate Report    % Normal (Semen)      >=4 % 3.5 !           Treatment to date:   Fertility Cycles       Cycle Name Treatment Start Date Type Outcome    Cycle Created on 12/9/2024   Active            Past Medical History:   Diagnosis Date    Asthma      Past Surgical History:   Procedure Laterality Date    ANTERIOR CRUCIATE LIGAMENT REPAIR Right     KNEE OPEN LATERAL RELEASE Right      Current Outpatient Medications on File Prior to Visit   Medication Sig Dispense Refill    biotin 10,000 mcg capsule Take 10,000 capsules (100,000 mg) by mouth.      cholecalciferol (Vitamin D3) 50 mcg (2,000 unit) capsule Take 1 capsule (50 mcg) by mouth once daily.      prenatal vit37/iron/folic acid (PRENATA ORAL) Take by mouth.       No current facility-administered medications on file prior to visit.       BMI:   BMI Readings from Last 1 Encounters:   02/26/25 30.04 kg/m²     VITALS:  Ht 1.626 m (5' 4\")   Wt 79.4 kg (175 lb)   LMP 02/07/2025 (Approximate)   BMI 30.04 kg/m²   LMP: Patient's last menstrual period was 02/07/2025 (approximate).    This is a telehealth appointment    ASSESSMENT   Farrah is a 24-year-old female who returns today for follow-up of fertility testing.  Results of this couples testing was discussed at length, and they understand testing is suggestive of unexplained infertility.  Meaning of this diagnosis and implications were discussed at length, " and all questions were answered to their satisfaction.  Risk, benefits, and details of various treatment options were reviewed with this couple including timed intercourse, ovulation induction/OPK/timed intercourse, ovulation induction/US/timed intercourse with and without trigger, ovulation induction/OPK/IUI, ovulation induction/US/hCG/IUI, and IVF.  Following this discussion, they elected to proceed with ovulation induction/US/hCG/IUI x 2 to 3 months as ovulation predictor kits have been largely unreliable for them.  If they elect to add an IUI x 2-3 cycles it would be reasonable.    This couple has not yet heard from financial counseling, will reach out to our practice managers to help coordinate this consultation.    If patient elects to proceed with varicella vaccination, she understands that we recommend waiting 1 month after vaccination to proceed with attempts to become pregnant.  If this couple elects to proceed with genetic testing, they understand that we recommend waiting for results before proceeding with additional fertility cycles.    COUNSELING  We discussed that Clomid is used for ovulation induction. We discussed common side effects of Clomid including headaches, vision changes, hot flashes, mood changes, and breast tenderness.  We discussed that there is an increased risk of multiple gestation with Clomid approximately 10% for twins and less than 1% for triplets.  Counseled regarding option of selective reduction for higher order multiples.    Routine Testing  Fertility Center  STDs Within 1 year   Genetic carrier Waiver/Completed   T&S Within 1 year   AMH Within 1 year   TSH Within 1 year   Rubella/Varicella Within 5 years     BMI Testing  Fertility Center  CBC Within 1 year   CMP Within 1 year   HgbA1c Within 1 year   Mag, Phos, Vit D <18 Within 1 year   MFM > 40  REQ   Wt loss consult > 40 OPT     PLAN  [X ] will reach out the practice manager to help coordinate financial counseling  consultation to discuss costs and coverage (staff message sent)  [X ] plan for Clomid 100/US/hCG/timed intercourse x 2-3 cycles.  If the patient is not pregnant following this treatment regimen, this couple is to follow-up with me to discuss options and next steps.  If this couple elects to add an IUI x 2-3 cycles in the midst of this treatment plan it would be appropriate.      FOLLOW UP   Consults: Financial consult  Engaged MD  Take prenatal vitamins, vitamin D 2000 IUs daily  Discussed that treatment cannot proceed until checklist items are complete.   Additional testing for BMI < 18 or > 40: No.  Patient to schedule follow up visit as described above. Advised patient to arrange this now with the .  Chart to primary nurse for care coordination and patient check list/education.    MD Completion:  Ectopic Risk: No  Medically Complex: No  Outstanding boarding pass items: None    Fertility Plan Update: plan for Clomid 100/US/hCG/timed intercourse x 2-3 cycles.  If the patient is not pregnant following this treatment regimen, this couple is to follow-up with me to discuss options and next steps.  If this couple elects to add an IUI x 2-3 cycles in the midst of this treatment plan it would be appropriate.      Intimate Exam Performed: No, an intimate exam was not performed at this encounter.     Arden Morrell  02/26/2025  11:41 AM

## 2025-03-12 ENCOUNTER — TELEMEDICINE (OUTPATIENT)
Dept: ENDOCRINOLOGY | Facility: CLINIC | Age: 25
End: 2025-03-12
Payer: COMMERCIAL

## 2025-03-12 ENCOUNTER — SPECIALTY PHARMACY (OUTPATIENT)
Dept: PHARMACY | Facility: CLINIC | Age: 25
End: 2025-03-12

## 2025-03-12 ENCOUNTER — TELEPHONE (OUTPATIENT)
Dept: ENDOCRINOLOGY | Facility: CLINIC | Age: 25
End: 2025-03-12
Payer: COMMERCIAL

## 2025-03-12 VITALS — WEIGHT: 170 LBS | HEIGHT: 64 IN | BODY MASS INDEX: 29.02 KG/M2

## 2025-03-12 DIAGNOSIS — N97.9 FEMALE INFERTILITY: Primary | ICD-10-CM

## 2025-03-12 DIAGNOSIS — Z31.89 ENCOUNTER FOR ARTIFICIAL INSEMINATION: ICD-10-CM

## 2025-03-12 PROCEDURE — 1036F TOBACCO NON-USER: CPT | Performed by: NURSE PRACTITIONER

## 2025-03-12 PROCEDURE — 99213 OFFICE O/P EST LOW 20 MIN: CPT | Performed by: NURSE PRACTITIONER

## 2025-03-12 PROCEDURE — RXMED WILLOW AMBULATORY MEDICATION CHARGE

## 2025-03-12 PROCEDURE — 3008F BODY MASS INDEX DOCD: CPT | Performed by: NURSE PRACTITIONER

## 2025-03-12 RX ORDER — PROGESTERONE 200 MG/1
200 CAPSULE ORAL 2 TIMES DAILY
Qty: 60 CAPSULE | Refills: 0 | Status: SHIPPED | OUTPATIENT
Start: 2025-03-12 | End: 2026-03-12

## 2025-03-12 RX ORDER — CHORIONIC GONADOTROPIN 10000 UNIT
10000 KIT INTRAMUSCULAR ONCE AS NEEDED
Qty: 1 EACH | Refills: 0 | Status: SHIPPED | OUTPATIENT
Start: 2025-03-12

## 2025-03-12 RX ORDER — CLOMIPHENE CITRATE 50 MG/1
100 TABLET ORAL DAILY
Qty: 10 TABLET | Refills: 0 | Status: SHIPPED | OUTPATIENT
Start: 2025-03-12 | End: 2026-03-12

## 2025-03-12 ASSESSMENT — COLUMBIA-SUICIDE SEVERITY RATING SCALE - C-SSRS
6. HAVE YOU EVER DONE ANYTHING, STARTED TO DO ANYTHING, OR PREPARED TO DO ANYTHING TO END YOUR LIFE?: NO
2. HAVE YOU ACTUALLY HAD ANY THOUGHTS OF KILLING YOURSELF?: NO
1. IN THE PAST MONTH, HAVE YOU WISHED YOU WERE DEAD OR WISHED YOU COULD GO TO SLEEP AND NOT WAKE UP?: NO

## 2025-03-12 NOTE — TELEPHONE ENCOUNTER
Patient called with menses for IUI   cycle Cycle #:  1  Medication: Clomid   Ovulation: Trigger   Sperm Source:  partner fresh   Luteal Support:  N/A    Additional Medications:  N/A      Boarding Pass signed off: to be signed off at vBPV    IUI order pended: to be signed at vBPV  Additional Information: Telephone call returned to patient. Patient LMP 3/13/25, today. Patient transferred to front to schedule vBPV with NPOD.    03/12/25 at 2:19 PM - Steffanie Lentz RN

## 2025-03-12 NOTE — TELEPHONE ENCOUNTER
Reason for call: reporting start of cycle  LMP: 03/12  Treatment type: IUI  Note: pt wants a call back for next steps

## 2025-03-12 NOTE — PROGRESS NOTES
"Boarding Pass Oral TIC/IUI     Age: 25 y.o.    Provider: Arden Morrell MD  Primary Nurse: Cal  Reasons for Treatment: primary infertility x 1 year  Last BMI  25 : 30.04 kg/m²         Medical History        Past Medical History:   Diagnosis Date    Asthma              Date Done Consultation Results/Comments   25 Medication Protocol    plan for Clomid 100/US/hCG/timed intercourse x 2-3 cycles. If the patient is not pregnant following this treatment regimen, this couple is to follow-up with me to discuss options and next steps. If this couple elects to add an IUI x 2-3 cycles in the midst of this treatment plan it would be appropriate    3/7/2025 Authorization to Share [x]    N/A Procedure Order Placed N/A TIC   Date Done Female Labs Results/Comments   2024 T&S (Q 1 Year) ABO: B  Rh: POS  Antibody: NEG      2024 Hep B sAg Nonreactive   2024 Hep C AB Nonreactive   2024 HIV Nonreactive   2024 Syphilis Nonreactive   2025 GC/CT GC: NOT DETECTED  CT: NOT DETECTED   2024 Rubella (Q 5 Years) Positive   2024 Varicella (Q 5 Years) Negative     Carrier Screening [facility]:   Declined  N/A   3/7/2025  GYN Waiver [x]      Date Done Male Labs (required if IUI)    Results/Comments  NAZ HAYDEN (MRN: [id number])   10/18/2024 Hep B sAg Nonreactive   10/18/2024 Hep C AB  Nonreactive   10/18/2024 HIV Nonreactive   10/18/2024 Syphilis Nonreactive   10/18/2024 GC/CT GC: Negative  CT: Negative     Carrier Screening Declined  N/A   10/18/2024 Semen Analysis  Volume(mL): 2.9  Count(million): 59.85  Motility(%): 38  Motile Count(million): 2.329      MD Completion:  Ectopic Risk: No  Medically Complex: No    MONITORED CYCLE  You will take your oral fertility medications as prescribed cycle days 3-7. When you start the medications, you can also call the office to get set up for a \"follicle scan.\" Follicle scans are usually done between cycle days 10-12. It is a vaginal ultrasound. We " typically do not perform follicle scans on the weekends, so the schedulers will help you find an appropriate appointment time. The follicle scans are done in the morning and will give us information on how your eggs are growing and how thick the lining of the uterus is. Once the follicles (eggs) are big enough (ripe or mature) we will trigger their release with a one time subcutaneous injection- the nurses will order this for you and show you how to do it when you are here for the follicle scan. We cannot trigger small eggs, only ones that have grown to mature size. The IUI is usually done about 36 hours after the trigger. The nurse will also help you schedule that.      Clomid (clomiphene citrate) :  2 tablet (s) a day, cycle days 3-7   Note: 1st day of full flow is cycle day 1    Have sexual intercourse approximately every other day for 1 week beginning cycle day 11  You don't need temperature charts or LH predictor kits because normal cycle lengths indicate ovulatory cycles.  If you are planning inseminations, you will use LH predictor kits for timing      If normal 28 - 32 day cycle, repeat above for a total of 3 cycles    Call office if:   pregnant  cycles longer than 35 days   not pregnant after 3 regular cycles     Side effects of clomid may include:  abdominal discomfort and/or ovarian cysts  headache   hot flushes  mood changes  nausea  visual changes    If side effects are severe, alternative medications are available. Clomid has a 10% chance of multiple pregnancy (twins or more).    Progesterone:  May be given to bring on a period if you have not had a period after 40 days and a home pregnancy test is negative. Continue to take Progesterone if you have light bleeding. Expect to bleed within 2 weeks of finishing Progesterone.      Boarding pass reviewed with:  Protocol:  Clomid 100mg/monitoring/trigger/IUI x3/LP prometrium 100mg BID starting 3 days after IUI  Sperm: partner  Testing up to date.  yes  Procedure order placed. Yes    Boarding pass approved for 3 cycles/until 10/25    Grazyna Izaguirre 03/12/25 3:53 PM

## 2025-03-17 ENCOUNTER — TELEPHONE (OUTPATIENT)
Dept: ENDOCRINOLOGY | Facility: CLINIC | Age: 25
End: 2025-03-17
Payer: COMMERCIAL

## 2025-03-17 ENCOUNTER — SPECIALTY PHARMACY (OUTPATIENT)
Dept: PHARMACY | Facility: CLINIC | Age: 25
End: 2025-03-17

## 2025-03-17 ENCOUNTER — PHARMACY VISIT (OUTPATIENT)
Dept: PHARMACY | Facility: CLINIC | Age: 25
End: 2025-03-17
Payer: COMMERCIAL

## 2025-03-21 ENCOUNTER — LAB REQUISITION (OUTPATIENT)
Dept: LAB | Facility: HOSPITAL | Age: 25
End: 2025-03-21

## 2025-03-21 ENCOUNTER — ANCILLARY PROCEDURE (OUTPATIENT)
Dept: ENDOCRINOLOGY | Facility: CLINIC | Age: 25
End: 2025-03-21
Payer: COMMERCIAL

## 2025-03-21 DIAGNOSIS — N97.9 FEMALE INFERTILITY, UNSPECIFIED: ICD-10-CM

## 2025-03-21 DIAGNOSIS — N97.9 FEMALE INFERTILITY: ICD-10-CM

## 2025-03-21 LAB
ESTRADIOL SERPL-MCNC: 123 PG/ML
LH SERPL-ACNC: 6.3 IU/L
PROGEST SERPL-MCNC: 0.5 NG/ML

## 2025-03-21 PROCEDURE — 83002 ASSAY OF GONADOTROPIN (LH): CPT

## 2025-03-21 PROCEDURE — 76857 US EXAM PELVIC LIMITED: CPT

## 2025-03-21 PROCEDURE — 76857 US EXAM PELVIC LIMITED: CPT | Performed by: OBSTETRICS & GYNECOLOGY

## 2025-03-21 PROCEDURE — 82670 ASSAY OF TOTAL ESTRADIOL: CPT

## 2025-03-21 PROCEDURE — 84144 ASSAY OF PROGESTERONE: CPT

## 2025-03-21 NOTE — PROGRESS NOTES
CYCLING NOTE - IUI  Cycle #: 1  Reason For Treatment: Primary Infertility   Protocol: Clomid 100/US/hCG/timed intercourse x 2-3 cycles   Day of stim: CD 10  Patient Hx: 25 year old, AMH = 11.263      Here for US and/or lab monitoring; relevant findings reviewed.    Patient stayed for nurse visit. Pain is 0/10  Team will contact patient later today with results and plan.    Fawn Lee  03/21/2025  8:29 AM      Called patient with results and plan. Patient aware to return on Monday for repeat US and labs. Patient will have IC over the weekend in case of premature ovulation.   Fawn Lee 03/21/25 12:05 PM

## 2025-03-24 ENCOUNTER — LAB REQUISITION (OUTPATIENT)
Dept: LAB | Facility: HOSPITAL | Age: 25
End: 2025-03-24

## 2025-03-24 ENCOUNTER — ANCILLARY PROCEDURE (OUTPATIENT)
Dept: ENDOCRINOLOGY | Facility: CLINIC | Age: 25
End: 2025-03-24
Payer: COMMERCIAL

## 2025-03-24 DIAGNOSIS — N97.9 FEMALE INFERTILITY, UNSPECIFIED: ICD-10-CM

## 2025-03-24 DIAGNOSIS — N97.9 FEMALE INFERTILITY: ICD-10-CM

## 2025-03-24 LAB
ESTRADIOL SERPL-MCNC: 245 PG/ML
LH SERPL-ACNC: 9.9 IU/L
PROGEST SERPL-MCNC: 0.9 NG/ML

## 2025-03-24 PROCEDURE — 82670 ASSAY OF TOTAL ESTRADIOL: CPT

## 2025-03-24 PROCEDURE — 84144 ASSAY OF PROGESTERONE: CPT

## 2025-03-24 PROCEDURE — 76857 US EXAM PELVIC LIMITED: CPT

## 2025-03-24 PROCEDURE — 83002 ASSAY OF GONADOTROPIN (LH): CPT

## 2025-03-24 PROCEDURE — 76857 US EXAM PELVIC LIMITED: CPT | Performed by: STUDENT IN AN ORGANIZED HEALTH CARE EDUCATION/TRAINING PROGRAM

## 2025-03-24 NOTE — PROGRESS NOTES
CYCLING NOTE    Here for US and/or lab monitoring; relevant findings reviewed. 25 year old patient of Dr. Morrell is here to monitor for IUI #1. Patient took Clomid 100mg. Planning to trigger for partner IUI -- Today is CD13.    Patient stayed for nurse visit. Pain is 0/10  Team will contact patient later today with results and plan.    JON OCONNOR  03/24/2025  8:26 AM    Placed call to patient to review trigger plan as documented in stimulation summary. Patient to trigger tomorrow between 8-10pm. Patient understands to plan to have intercourse tomorrow and abstain until after IUI. Reviewed IUI will be Thursday morning. Patient verbalized understanding of plan, all questions addressed at this time. Transferred patient to  to schedule this appointment.   JON OCONNOR RN 03/24/2025 12:34

## 2025-03-27 ENCOUNTER — PROCEDURE VISIT (OUTPATIENT)
Dept: ENDOCRINOLOGY | Facility: CLINIC | Age: 25
End: 2025-03-27

## 2025-03-27 DIAGNOSIS — Z31.89 ENCOUNTER FOR ARTIFICIAL INSEMINATION: Primary | ICD-10-CM

## 2025-03-27 PROCEDURE — 89261 SPERM ISOLATION COMPLEX: CPT | Performed by: OBSTETRICS & GYNECOLOGY

## 2025-03-27 PROCEDURE — 58322 ARTIFICIAL INSEMINATION: CPT

## 2025-03-27 NOTE — PROGRESS NOTES
"Patient ID: Farrah Gardner \"Angela\" is a 25 y.o. female.    Intrauterine Insemination (IUI)    Date/Time: 3/27/2025 9:20 AM    Performed by: SANDRA Ugarte  Authorized by: SANDRA Proctor    Consent:     Consent obtained:  Verbal and written    Consent given by:  Patient    Procedure risks and benefits discussed: yes      Patient questions answered: yes      Patient agrees, verbalizes understanding, and wants to proceed: yes      Educational handouts given: yes      Instructions and paperwork completed: yes    Procedure:     Specimen source: partner      Specimen condition: fresh      Pelvic exam performed: yes      Speculum placed in vagina: yes      Tenaculum applied to cervix: no      Type of insemination: intrauterine insemination      Ultrasound guidance: No      Speculum type: Rowan      Catheter type: curved      Curvature: mild      Difficulty: easy      Estimated Blood Loss:  None    Specimen Return: none    Post-procedure:     Patient tolerated procedure well: yes      Post-procedure plan: patient counseled on signs and symptoms for which to call and/or return to clinic    Comments:     Procedure comments:  IUI Procedure note:    The patient presented today for IUI.     Consent signed by patient, IUI sample identified by patient, and Final Verification performed with patient via electronic system \"\" prior to insemination.   All patient's questions were discussed and answered.      Patient declined.    Specimen Source: Partner  Specimen Condition: Fresh  TMS 15.31 million motility 73%    Additional notes:    Patient was advised to call office if she develops fever, chills, pelvic pain, or heavy bleeding.    Progesterone and post-IUI teaching completed. Will start Progesterone three days after IUI and continue twice daily. Pt will do a home pregnancy test two weeks from IUI date. If home pregnancy test positive, patient will continue Progesterone and [facility]. " If home pregnancy test negative, patient will discontinue Progesterone, and was advised to call office with start of menses; will proceed with another cycle if appropriate.  Patient verbalized understanding of plan.    Muriel Salinas CNP 03/27/25 9:45 AM

## 2025-04-14 ENCOUNTER — TELEPHONE (OUTPATIENT)
Dept: ENDOCRINOLOGY | Facility: CLINIC | Age: 25
End: 2025-04-14
Payer: COMMERCIAL

## 2025-04-14 DIAGNOSIS — N97.9 FEMALE INFERTILITY: ICD-10-CM

## 2025-04-14 PROCEDURE — RXMED WILLOW AMBULATORY MEDICATION CHARGE

## 2025-04-14 RX ORDER — CHORIONIC GONADOTROPIN 10000 UNIT
10000 KIT INTRAMUSCULAR ONCE AS NEEDED
Qty: 1 EACH | Refills: 0 | Status: SHIPPED | OUTPATIENT
Start: 2025-04-14

## 2025-04-14 RX ORDER — CLOMIPHENE CITRATE 50 MG/1
100 TABLET ORAL DAILY
Qty: 10 TABLET | Refills: 0 | Status: SHIPPED | OUTPATIENT
Start: 2025-04-14 | End: 2026-04-14

## 2025-04-14 NOTE — TELEPHONE ENCOUNTER
Reason for call: Patient and her partner declined genetic testing initially now they want to get it done calling to check if an order can be placed for them.  Notes:

## 2025-04-14 NOTE — TELEPHONE ENCOUNTER
Reason for call: reporting start of cycle  LMP: 4/12  Treatment type: IUI  Note: Pt needs script for Clomid sent to [facility] on [address] in [place] and her trigger shot sent to  [facility].

## 2025-04-14 NOTE — TELEPHONE ENCOUNTER
Returned call to patient. Patient will call if this cycle is not successful. Patient will call lata once ready for [facility]. Patient verbalized understanding.  carolin curtis 04/14/25 3:31 PM'

## 2025-04-14 NOTE — TELEPHONE ENCOUNTER
Patient called with menses for IUI cycle  Cycle #:  2  Medication: Clomid  Ovulation: Trigger  Sperm Source:  partner fresh  Approved donor sperm number:  Luteal Support:  n/a  Additional Medications:  n/a  Boarding Pass signed off: yes, 03/12/2025  IUI order pended:   Additional Information:  Lmp = 04/12/2025    Patient is interested in Myriad testing with next cycle if this one is unsuccessful. Patient made aware she can not have labs pending for Myriad and have another IUI cycle.  carolin curtis 04/14/25 1:33 PM

## 2025-04-17 ENCOUNTER — APPOINTMENT (OUTPATIENT)
Dept: ENDOCRINOLOGY | Facility: CLINIC | Age: 25
End: 2025-04-17

## 2025-04-18 ENCOUNTER — PHARMACY VISIT (OUTPATIENT)
Dept: PHARMACY | Facility: CLINIC | Age: 25
End: 2025-04-18
Payer: COMMERCIAL

## 2025-04-18 ENCOUNTER — SPECIALTY PHARMACY (OUTPATIENT)
Dept: PHARMACY | Facility: CLINIC | Age: 25
End: 2025-04-18

## 2025-04-21 ENCOUNTER — ANCILLARY PROCEDURE (OUTPATIENT)
Dept: ENDOCRINOLOGY | Facility: CLINIC | Age: 25
End: 2025-04-21
Payer: COMMERCIAL

## 2025-04-21 ENCOUNTER — LAB REQUISITION (OUTPATIENT)
Dept: LAB | Facility: HOSPITAL | Age: 25
End: 2025-04-21

## 2025-04-21 DIAGNOSIS — Z31.89 ENCOUNTER FOR ARTIFICIAL INSEMINATION: ICD-10-CM

## 2025-04-21 DIAGNOSIS — N97.9 FEMALE INFERTILITY, UNSPECIFIED: ICD-10-CM

## 2025-04-21 DIAGNOSIS — N97.9 FEMALE INFERTILITY: ICD-10-CM

## 2025-04-21 LAB
ESTRADIOL SERPL-MCNC: 302 PG/ML
LH SERPL-ACNC: 9.2 IU/L
PROGEST SERPL-MCNC: 0.8 NG/ML

## 2025-04-21 PROCEDURE — 83002 ASSAY OF GONADOTROPIN (LH): CPT

## 2025-04-21 PROCEDURE — 76857 US EXAM PELVIC LIMITED: CPT | Performed by: OBSTETRICS & GYNECOLOGY

## 2025-04-21 PROCEDURE — 82670 ASSAY OF TOTAL ESTRADIOL: CPT

## 2025-04-21 PROCEDURE — 76857 US EXAM PELVIC LIMITED: CPT

## 2025-04-21 PROCEDURE — 84144 ASSAY OF PROGESTERONE: CPT

## 2025-04-21 NOTE — PROGRESS NOTES
CYCLING NOTE    Here for US and/or lab monitoring; relevant findings reviewed. 25 year old patient of Dr. Morrell is here to monitor for IUI #2. Patient took clomid 100mg. Today is CD10. Planning to trigger for partner IUI.     Patient stayed for nurse visit. Pain is 0/10  Team will contact patient later today with results and plan.    JON OCONNOR  04/21/2025  9:11 AM      Placed call to patient to review plan as documented. Reviewed plan for triggering on 4/24 between 8-10PM for IUI on Saturday 4/26. Reviewed patient should plan to have intercourse 4/24 and abstain until after IUI. Patient verbalized understanding of plan and denies additional questions at this time. Message sent to  to schedule patient.   JON OCONNOR RN 04/21/2025 14:32

## 2025-04-26 ENCOUNTER — PROCEDURE VISIT (OUTPATIENT)
Dept: ENDOCRINOLOGY | Facility: CLINIC | Age: 25
End: 2025-04-26

## 2025-04-26 DIAGNOSIS — Z31.89 ENCOUNTER FOR ARTIFICIAL INSEMINATION: ICD-10-CM

## 2025-04-26 PROCEDURE — 89261 SPERM ISOLATION COMPLEX: CPT | Performed by: OBSTETRICS & GYNECOLOGY

## 2025-04-26 PROCEDURE — 58322 ARTIFICIAL INSEMINATION: CPT | Performed by: STUDENT IN AN ORGANIZED HEALTH CARE EDUCATION/TRAINING PROGRAM

## 2025-04-26 NOTE — PROGRESS NOTES
"Patient ID: Farrah Gardner \"Angela\" is a 25 y.o. female.    Intrauterine Insemination    Date/Time: 4/26/2025 9:30 AM    Performed by: Lana Gaming MD  Authorized by: EVANS Proctor-CNP    Consent:     Consent obtained:  Verbal and written    Consent given by:  Patient    Procedure risks and benefits discussed: yes      Patient questions answered: yes      Patient agrees, verbalizes understanding, and wants to proceed: yes      Educational handouts given: yes      Instructions and paperwork completed: yes    Procedure:     Pelvic exam performed: yes      Speculum placed in vagina: yes      Tenaculum applied to cervix: no      Type of insemination: intrauterine insemination      Ultrasound guidance: No      Speculum type: Rowan      Catheter type: curved      Curvature: mild      Difficulty: easy      Estimated Blood Loss:  None    Specimen Return: none    Post-procedure:     Patient tolerated procedure well: yes      Post-procedure plan: patient counseled on signs and symptoms for which to call and/or return to clinic    Comments:     Procedure comments:  IUI Procedure note:    The patient presented today for IUI.     Consent signed by patient, IUI sample identified by patient, and Final Verification performed with patient via electronic system \"\" prior to insemination.   All patient's questions were discussed and answered.      Patient declined.    Specimen Source: Partner  Specimen Condition: Fresh  TMS 6.19    Additional notes:    Patient was advised to call office if she develops fever, chills, pelvic pain, or heavy bleeding.    Progesterone and post-IUI teaching completed. Will start Progesterone three days after IUI and continue twice daily. Pt will do a home pregnancy test two weeks from IUI date. If home pregnancy test positive, patient will continue Progesterone and call office to schedule [facility]. If home pregnancy test negative, patient will discontinue Progesterone, and was advised to " call office with start of menses; will proceed with another cycle if appropriate.  Patient verbalized understanding of plan.    Lana Gaming 04/26/25 9:57 AM              I reviewed the key and critical portions of the history and physical exam and/or was physically present for the key and critical portions performed by the fellow.  I reviewed the fellow's documentation and discussed the patient with the fellow.  I agree with the fellow's medical decision making as documented on the fellow's note.  Sarahy Ricketts 04/26/25 10:20 AM

## 2025-04-30 ENCOUNTER — TELEPHONE (OUTPATIENT)
Dept: ENDOCRINOLOGY | Facility: CLINIC | Age: 25
End: 2025-04-30
Payer: COMMERCIAL

## 2025-04-30 NOTE — TELEPHONE ENCOUNTER
Reason for call:   Notes: Called patient to cancel appointment with Dr. Morrell on 5/28 because the provider wont be in. Patient is wanting to know if a nurse can giver her call to answer some questions about IVF

## 2025-04-30 NOTE — TELEPHONE ENCOUNTER
Telephone call returned to patient. Patient was to have FUV with Dr. Morrell on 5/28 but her appointment was unfortunately cancelled by our office. Patient just had her 2nd IUI on 4/26 this past Saturday and her original plan was for 2-3 IUI cycles. Patient inquiring on if we would recommend 3-6 IUI cycles as sometimes we do, or if we would recommend IVF as her next step if her 3rd IUI is not successful. Plan for this RN to speak with Dr. Morrell Friday as he is DOD then. Patient agreeable and this RN to reach back out.    04/30/25 at 4:33 PM - Steffanie Lentz RN

## 2025-05-02 ENCOUNTER — DOCUMENTATION (OUTPATIENT)
Dept: ENDOCRINOLOGY | Facility: CLINIC | Age: 25
End: 2025-05-02
Payer: COMMERCIAL

## 2025-05-02 NOTE — PROGRESS NOTES
MD Steffanie Sampson RN  I'm ok with 3 or 4.  Have her make a follow up after that.  Ivf versus regular follow up depends on how things go with her next IUI's and how she is feeling about the process.          Previous Messages       ----- Message -----  From: Steffanie Lentz RN  Sent: 5/2/2025   3:43 PM EDT  To: Arden Morrell MD  Subject: IUI Protocol                                    Hi Dr. Morrell,    This patient has been cleared for 2-3 cycles of IUI with us with plans to do FUV if not successful. She is a 25 year old patient of yours. Patient just completed IUI #2 recently and was to have FUV on 5/28 with you - this visit unfortunately got cancelled. Patient was inquiring if since she has to move out the date of her FUV, if we would approve her for 3-6 cycles of IUI? Or if not, should she just make an IVF consult as her next step?    Steffanie

## 2025-05-07 ENCOUNTER — TELEMEDICINE (OUTPATIENT)
Dept: ENDOCRINOLOGY | Facility: CLINIC | Age: 25
End: 2025-05-07
Payer: COMMERCIAL

## 2025-05-07 VITALS — HEIGHT: 64 IN | WEIGHT: 155 LBS | BODY MASS INDEX: 26.46 KG/M2

## 2025-05-07 DIAGNOSIS — Z13.71 SCREENING FOR GENETIC DISEASE CARRIER STATUS: ICD-10-CM

## 2025-05-07 DIAGNOSIS — Z31.41 FERTILITY TESTING: Primary | ICD-10-CM

## 2025-05-07 PROCEDURE — 99213 OFFICE O/P EST LOW 20 MIN: CPT | Performed by: OBSTETRICS & GYNECOLOGY

## 2025-05-07 PROCEDURE — 3008F BODY MASS INDEX DOCD: CPT | Performed by: OBSTETRICS & GYNECOLOGY

## 2025-05-07 ASSESSMENT — PATIENT HEALTH QUESTIONNAIRE - PHQ9
1. LITTLE INTEREST OR PLEASURE IN DOING THINGS: NOT AT ALL
SUM OF ALL RESPONSES TO PHQ9 QUESTIONS 1 AND 2: 0
2. FEELING DOWN, DEPRESSED OR HOPELESS: NOT AT ALL

## 2025-05-07 ASSESSMENT — COLUMBIA-SUICIDE SEVERITY RATING SCALE - C-SSRS
2. HAVE YOU ACTUALLY HAD ANY THOUGHTS OF KILLING YOURSELF?: NO
1. IN THE PAST MONTH, HAVE YOU WISHED YOU WERE DEAD OR WISHED YOU COULD GO TO SLEEP AND NOT WAKE UP?: NO
6. HAVE YOU EVER DONE ANYTHING, STARTED TO DO ANYTHING, OR PREPARED TO DO ANYTHING TO END YOUR LIFE?: NO

## 2025-05-07 ASSESSMENT — PAIN SCALES - GENERAL: PAINLEVEL_OUTOF10: 0-NO PAIN

## 2025-05-07 NOTE — PROGRESS NOTES
Virtual or Telephone Consent: An interactive audio and video telecommunication system which permits real time communications between the patient (at the originating site) and provider (at the distant site) was utilized to provide this telehealth service  MD reviewed, Authorization status not noted.    Follow Up Visit HPI    Patient is a 25 y.o.  female with Unexplained Infertility presenting today for follow up visit.     Testing to date:   Component      Latest Ref Rng 2024   ABO TYPE B    Rh Type POS    ANTIBODY SCREEN NEG    Rubella, IgG      Negative  Positive    Rubella, IgG Index      <=0.7 IA IA 2.6    Varicella Zoster, IgG      Negative  Negative    Varicella Zoster, IgG Index      <=0.8 IA 0.3    Anti-Mullerian Hormone      0.401 - 16.015 ng/mL 11.263    Thyroid Stimulating Hormone      0.44 - 3.98 mIU/L 1.52    Hepatitis B Surface AG      Nonreactive  Nonreactive    Hepatitis C AB      Nonreactive  Nonreactive    HIV 1/2 Antigen/Antibody Screen with Reflex to Confirmation      Nonreactive  Nonreactive    Syphilis Total Ab      Nonreactive  Nonreactive       GC/Chlamydia from 25: negative    They elected to hold off on varicella vaccine. Risks of primary varicella infection in pregnancy was discussed previously with Dr Morrell    GYN Pelvic Ultrasound: US PELVIS TRANSVAGINAL (2024): Unremarkable pelvic survey. The ovaries are noted to have numerous small physiologic peripheral follicles, consistent with possible polycystic ovarian syndrome.  Transabdominal images were acquired in addition to transvaginal images for optimal visualization of pelvic structures.     Hysterosalpingogram: FL HYSTEROSALPINGOGRAM (2024):    Uterus:  normal contour without filling defects (balloon catheter was used)  Tubes:  bilateral patency with free spill of dye, normal tubal architecture noted, and no loculations present.    Partner SA: Normal  Soto Gardner 98  Component      Latest Ref Rng  "10/18/2024   Volume (Semen)      >=1.5 mL 2.90    Concentration(Semen)      >=15 mill/mL 59.85    Total Motility (Semen)      >=40 % 38 !    Prog. Motility (Semen)      >=32 % 31 !    Non Prog. Motility (Semen)      % 8    Total No of Sperm (Semen)      >=39 mill 173.57    Total No of Motile (Semen)      mill 66.55    Total No of Rnd Cells (Semen)      <=5 mill 2.6    Leukocyte (Semen) Negative See Separate Report    % Normal (Semen)      >=4 % 3.5 !        Treatment to date:   Fertility Cycles       Cycle Name Treatment Start Date Type Outcome    Clomid #2 2025 IUI Active    Clomid IUI #1 2025 IUI No Pregnancy            Medical History[1]  Surgical History[2]  Medications Ordered Prior to Encounter[3]    BMI:   BMI Readings from Last 1 Encounters:   25 26.61 kg/m²     VITALS:  Ht 1.626 m (5' 4\")   Wt 70.3 kg (155 lb)   LMP 04/15/2025   BMI 26.61 kg/m²   LMP: Patient's last menstrual period was 04/15/2025.    ASSESSMENT   25 y.o.  female with primary infertility x 1 years, Unexplained Infertility and the following pertinent medical issues: none.  Possible PCOS given AMH 11, and small physiologic follicles on ultrasound (not in classic eccentric position)    COUNSELING  Discussed that patient has failed IUIx1 and is currently waiting to take pregnancy test following 2nd IUI. After offering moving straight to IVF vs trying a few more rounds of IUI, patient is interested in pursuing at least one more round of IUI prior to embarking on IVF.     Reviewed possible diagnosis of PCOS given elevated AMH. Ordered androgen testing today to be performed. If consistent with PCOS could recommend letrozole with next IUI.     Routine Testing  Fertility Center  STDs Within 1 year   Genetic carrier Waiver/Completed   T&S Within 1 year   AMH Within 1 year   TSH Within 1 year   Rubella/Varicella Within 5 years     BMI Testing  Fertility Center  CBC Within 1 year   CMP Within 1 year   HgbA1c Within 1 " year   Mag, Phos, Vit D <18 Within 1 year   MFM > 40  REQ   Wt loss consult > 40 OPT     PLAN  Orders Placed This Encounter   Procedures    Testosterone,Free and Total    Dhea-Sulfate    17-Hydroxyprogesterone    Progesterone       FOLLOW UP   Consults: none  Engaged MD  Take prenatal vitamins, vitamin D 2000 IUs daily  Discussed that treatment cannot proceed until checklist items are complete.   Additional testing for BMI < 18 or > 40: No.  Patient to schedule IVF consult -- message sent to   Chart to primary nurse for care coordination and patient check list/education.    MD Completion:  Ectopic Risk: No  Medically Complex: No  Outstanding boarding pass items:   [ ] myriad testing if patient decides to proceed with IVF (order for patient and partner placed today)    Fertility Plan Update:  Will continue at least one more IUI cycle (after following up on androgen testing, can consider letrozole)    If not pregnant at that time would recommend IVF consult (will get scheduled today)     Intimate Exam Performed: No, an intimate exam was not performed at this encounter.     Aruna Hall  05/07/2025  10:31 AM             [1]   Past Medical History:  Diagnosis Date    Asthma    [2]   Past Surgical History:  Procedure Laterality Date    ANTERIOR CRUCIATE LIGAMENT REPAIR Right     KNEE OPEN LATERAL RELEASE Right    [3]   Current Outpatient Medications on File Prior to Visit   Medication Sig Dispense Refill    cholecalciferol (Vitamin D3) 50 mcg (2,000 unit) capsule Take 1 capsule (2,000 Units) by mouth once daily.      prenatal vit37/iron/folic acid (PRENATA ORAL) Take by mouth.      biotin 10,000 mcg capsule Take 10,000 capsules (100,000 mg) by mouth.      chorionic gonadotropin (Pregnyl) 10,000 unit injection Reconstitute according to instructions and inject 10,000 units (1 mL) under the skin as a one time dose, as directed per provider for trigger. 1 each 0    clomiPHENE (Clomid) 50 mg tablet Take 2 tablets  (100 mg) by mouth once daily. Cycle days 3-7 10 tablet 0    progesterone (Prometrium) 200 mg capsule Insert 1 capsule (200 mg) into the vagina 2 times a day. Starting 3 days after IUI 60 capsule 0     No current facility-administered medications on file prior to visit.

## 2025-05-07 NOTE — Clinical Note
Your patient who is going to proceed with at least one more cycle of IUI prior to IVF. Messaged the  to get scheduled for IVF consult.  No billing entered  Thanks!  Aruna

## 2025-05-09 ASSESSMENT — LIFESTYLE VARIABLES: CURRENT_SMOKER: NO

## 2025-05-12 ENCOUNTER — LAB (OUTPATIENT)
Dept: LAB | Facility: HOSPITAL | Age: 25
End: 2025-05-12
Payer: COMMERCIAL

## 2025-05-12 ENCOUNTER — LAB REQUISITION (OUTPATIENT)
Dept: LAB | Facility: HOSPITAL | Age: 25
End: 2025-05-12

## 2025-05-12 ENCOUNTER — APPOINTMENT (OUTPATIENT)
Dept: LAB | Facility: HOSPITAL | Age: 25
End: 2025-05-12
Payer: COMMERCIAL

## 2025-05-12 DIAGNOSIS — N97.9 FEMALE INFERTILITY, UNSPECIFIED: ICD-10-CM

## 2025-05-12 LAB
ESTRADIOL SERPL-MCNC: 21 PG/ML
LH SERPL-ACNC: 3.5 IU/L
PROGEST SERPL-MCNC: 0.3 NG/ML

## 2025-05-12 PROCEDURE — 83002 ASSAY OF GONADOTROPIN (LH): CPT

## 2025-05-12 PROCEDURE — 82670 ASSAY OF TOTAL ESTRADIOL: CPT

## 2025-05-12 PROCEDURE — 84144 ASSAY OF PROGESTERONE: CPT

## 2025-05-13 LAB — HOLD SPECIMEN: NORMAL

## 2025-05-14 NOTE — PROGRESS NOTES
Visit Type: In Person  MD reviewed, Authorization status not noted.    IVF Note     Patient is a 25 y.o.  female, here for IVF consult due to Infertility           Here with Spouse    Have you had any concerns about your fertility treatments so far? Just curious about if never having a positive pregnancy test will play a part in the likelyhood of this working.     What are you goals for today's visit? Create a timline, talk about when and how to start the process, talk about possible payment plans, and answer any questions we may have.     What causes of infertility have been identified on your workup so far? Unexplained infertility    Past Infertility Treatments: Yes     Please summarize your fertility treatments to date. 2 IUIs and 2 rounds of Clomid    Fertility Cycles       Cycle Name Treatment Start Date Type Outcome    Clomid #2 2025 IUI Active    Clomid IUI #1 2025 IUI No Pregnancy          LMP: Date     LMP Date: 2025     Menstrual cycles: Regular     AMH: 11.263 (Ref range: 0.401 - 16.015 ng/mL)    Status of fallopian tubes: [place] HYSTEROSALPINGOGRAM (2024):    Smooth uterine cavity with no demonstrated changes in endometrial  contour or filling defects noted. The fallopian tubes fill with  contrast and free peritoneal spill is noted bilaterally. Normal tubal  architecture is noted.    Ultrasound 24: Unremarkable pelvic survey. The ovaries are noted to have numerous small physiologic peripheral follicles, consistent with possible polycystic ovarian syndrome.  Transabdominal images were acquired in addition to transvaginal images for optimal visualization of pelvic structures.    Hysteroscopy: not yet performed    GYN HISTORY   HX of abnormal Mammo: No    Last mammogram: N/a    HX of abnormal pap smear: No    Last pap smear:   A. THINPREP PAP CERVIX, SCREENING -      Specimen Adequacy  Satisfactory for evaluation; endocervical/transformation zone component is  "present  Quality Indicator: Partially obscuring inflammation     General Categorization  Negative for intraepithelial lesion or malignancy.     Descriptive Interpretation  Negative for intraepithelial lesion or malignancy    HPV:not performed    PMH  Medical History[1]  PT Prev Gen Scrn: No, I have not undergone genetic carrier screening that I am aware of.    History of any blood clotting disorders: No    History of hospitalizations or surgeries: Yes     History of easy bleeding/bruising: No       PSH  Surgical History[2]    Genetic screening Hx  Patient: [facility] 2bP: PENDING  Sperm Source: SOTO GARDNER (MRN: [id number])  Sperm Myriad 2bP: PENDING    Social history  Social History[3]  Current smoker: No       Family history  Patient family history:       Current Meds  Current Medications[4]    Allergies  Patient has no known allergies.    Partner History  Name: Soto Gardner     SA in past year: Yes, normal result     Part Prev Gen Scrn: No, my partner has not undergone genetic carrier screening that I am aware of.    Partner family history:    none    VITALS:  /80   Pulse 85   Temp 37 °C (98.6 °F)   Resp 18   Ht 1.626 m (5' 4\")   Wt 80 kg (176 lb 6.4 oz)   LMP 2025 (Exact Date)   SpO2 97%   BMI 30.28 kg/m²   BMI:   BMI Readings from Last 1 Encounters:   05/15/25 30.28 kg/m²       ASSESSMENT   25 y.o.  female with unexplained infertility x 1 years, and the following pertinent medical issues: none.  Indication for IVF: Unexplained Infertility  Partner SA: Normal    We reviewed IVF and discussed the following:   In-vitro fertilization and embryo transfer  Stimulation protocols   Oocyte retrieval, risks    Cryopreservation   Assessment of fertilization   Embryo development  Statistics  ICSI/Assisted hatching   Embryo transfer and preparation    Risks of OHSS and multiple gestation   Cancelled cycles   Use of birth control   Selective reduction   Number of embryos to transfer   Ectopic pregnancy  " and miscarriage  Team based care  Informed consent procedures  Folic acid supplementation   Genetic carrier screening   PGT  Frozen tissue storage and transport process  Discussed that pap and mammogram must be updated per ACOG guidelines before treatment can begin    ART Cycle Plan    1. Protocol/Fertility Plan Update:   Lead in: OCP  Stimulation protocol: Antagonist, /menopur 75  Trigger plan: HCG vs Lupron  Pre-retrieval meds: Antibiotics per protocol  Adjuncts: none  Notes:     2. FET:  Protocol: Programmed  Adjuncts: none  OCP candidate: Yes  Notes:     3. Insemination:  Sperm source: partner  Sperm collection method: Fresh with Frozen Backup  Notes:  ICSI: Yes  # of oocytes to be fertilized: all    4. Transfer:   Number of embryos to replace: 1  Stage of embryo transfer: day 5  Trial transfer needed? No    5. Cryopreservation plan  PGT: No   Freeze all? Yes  Oocyte cryopreservation: No    6. Patient willing to accept blood transfusion: Yes    7. RN to review chart, initiate IVF boarding pass, and assure completion of the following prior to proceeding with IVF stimulation:       [ ] myriad results    Orders Placed This Encounter   Procedures    Diagnostic Hysteroscopy w Possible Polypectomy    Referral to Financial Planning    POCT pregnancy, urine manually resulted       STDs (Hepatitis B, Hepatitis C, HIV, Syphilis, GC/CT) for patient and partner (if applicable) to be completed within the last year (z11.3)  Genetic carrier testing: waiver or carrier screen completed with clearance documentation by provider for both patient and partner (z13.71)  Rubella and varicella to be completed within the last five years (z11.59)   TSH to be completed within the last year (z13.29)  Type & Screen to be completed within the last year (z01.83)  AMH to be completed within the last year (z31.41)  Pre-IVF Imaging: Reference any orders placed by provider.  Cavity evaluation: hysteroscopy order placed  Frozen sperm sample:  ensure frozen partner sample (z31.41) or verify donor sperm on site prior to stimulation start date.  Verify in EMR or obtain copy of patient’s last mammogram (if applicable) and pap smear results for provider review in boarding pass.  Enroll in Engaged MD and complete annual consent forms for IVF and cryotransport agreements.  BMI checklist for BMI <18 or >40  Consults: Nursing and Financial Consult.  PAT Consult: No  Ectopic Risk: No  Medically Complex: No  Additional consults Financial consult and review what is in the boarding pass.    Intimate Exam Performed: No, an intimate exam was not performed at this encounter.     Aruna Hall  05/15/2025  2:31 PM         [1]   Past Medical History:  Diagnosis Date    Asthma    [2]   Past Surgical History:  Procedure Laterality Date    ANTERIOR CRUCIATE LIGAMENT REPAIR Right     KNEE OPEN LATERAL RELEASE Right    [3]   Social History  Tobacco Use    Smoking status: Never     Passive exposure: Never    Smokeless tobacco: Never   Vaping Use    Vaping status: Never Used   Substance Use Topics    Alcohol use: Not Currently     Alcohol/week: 2.0 standard drinks of alcohol     Types: 1 Glasses of wine, 1 Standard drinks or equivalent per week     Comment: More per month    Drug use: Never   [4]   Current Outpatient Medications   Medication Sig Dispense Refill    biotin 10,000 mcg capsule Take 10,000 capsules (100,000 mg) by mouth.      cholecalciferol (Vitamin D3) 50 mcg (2,000 unit) capsule Take 1 capsule (2,000 Units) by mouth once daily.      chorionic gonadotropin (Pregnyl) 10,000 unit injection Reconstitute according to instructions and inject 10,000 units (1 mL) under the skin as a one time dose, as directed per provider for trigger. 1 each 0    clomiPHENE (Clomid) 50 mg tablet Take 2 tablets (100 mg) by mouth once daily. Cycle days 3-7 10 tablet 0    prenatal vit37/iron/folic acid (PRENATA ORAL) Take by mouth.      progesterone (Prometrium) 200 mg capsule Insert 1  capsule (200 mg) into the vagina 2 times a day. Starting 3 days after IUI 60 capsule 0     No current facility-administered medications for this visit.

## 2025-05-15 ENCOUNTER — CONSULT (OUTPATIENT)
Dept: ENDOCRINOLOGY | Facility: CLINIC | Age: 25
End: 2025-05-15
Payer: COMMERCIAL

## 2025-05-15 VITALS
HEIGHT: 64 IN | SYSTOLIC BLOOD PRESSURE: 133 MMHG | DIASTOLIC BLOOD PRESSURE: 80 MMHG | TEMPERATURE: 98.6 F | RESPIRATION RATE: 18 BRPM | HEART RATE: 85 BPM | OXYGEN SATURATION: 97 % | BODY MASS INDEX: 30.11 KG/M2 | WEIGHT: 176.4 LBS

## 2025-05-15 DIAGNOSIS — N97.9 FEMALE INFERTILITY: ICD-10-CM

## 2025-05-15 DIAGNOSIS — Z31.41 FERTILITY TESTING: Primary | ICD-10-CM

## 2025-05-15 PROCEDURE — 99215 OFFICE O/P EST HI 40 MIN: CPT | Performed by: OBSTETRICS & GYNECOLOGY

## 2025-05-15 PROCEDURE — 99215 OFFICE O/P EST HI 40 MIN: CPT | Mod: GC | Performed by: OBSTETRICS & GYNECOLOGY

## 2025-05-15 ASSESSMENT — COLUMBIA-SUICIDE SEVERITY RATING SCALE - C-SSRS
1. IN THE PAST MONTH, HAVE YOU WISHED YOU WERE DEAD OR WISHED YOU COULD GO TO SLEEP AND NOT WAKE UP?: NO
2. HAVE YOU ACTUALLY HAD ANY THOUGHTS OF KILLING YOURSELF?: NO
6. HAVE YOU EVER DONE ANYTHING, STARTED TO DO ANYTHING, OR PREPARED TO DO ANYTHING TO END YOUR LIFE?: NO

## 2025-05-15 ASSESSMENT — PAIN SCALES - GENERAL: PAINLEVEL_OUTOF10: 0-NO PAIN

## 2025-05-15 ASSESSMENT — PATIENT HEALTH QUESTIONNAIRE - PHQ9
SUM OF ALL RESPONSES TO PHQ9 QUESTIONS 1 AND 2: 0
2. FEELING DOWN, DEPRESSED OR HOPELESS: NOT AT ALL
1. LITTLE INTEREST OR PLEASURE IN DOING THINGS: NOT AT ALL

## 2025-05-17 LAB
17OHP SERPL-MCNC: NORMAL NG/DL
DHEA-S SERPL-MCNC: 257 MCG/DL (ref 14–349)
PROGEST SERPL-MCNC: 0.8 NG/ML
TESTOST FREE SERPL-MCNC: 3.4 PG/ML (ref 0.1–6.4)
TESTOST SERPL-MCNC: 19 NG/DL (ref 2–45)

## 2025-05-20 ENCOUNTER — TELEPHONE (OUTPATIENT)
Dept: ENDOCRINOLOGY | Facility: CLINIC | Age: 25
End: 2025-05-20
Payer: COMMERCIAL

## 2025-05-20 NOTE — TELEPHONE ENCOUNTER
Reason for call: Patient called to let us know that she will not be continuing her treatment with us anymore.  Notes:

## 2025-05-20 NOTE — TELEPHONE ENCOUNTER
Returned patient's call. She had called the office today stating that she will not be proceeding with treatments at . Called to discuss to see if this RN could help address her concerns. Patient was very pleasant but stated that there were some concerns with communication and the financial department. She stated she was given a quote and told she would need to pay it up front before procedures and stated they did not give any other options.   Patient did have a hysteroscopy scheduled for 5/21, and she was just seen to discuss IVF on 5/15. Patient has previously completed 2 clomid IUI cycles with our clinic.   [place] were mentioned to patient at this time, if this was something she would want to consider if they did plan to proceed with IVF, but patient has stated her and her partner already made up their mind to seek treatments elsewhere.      Message sent to Dr. Ortega for her awareness.     Fawn Lee 05/20/25 2:34 PM

## 2025-05-21 ENCOUNTER — APPOINTMENT (OUTPATIENT)
Dept: ENDOCRINOLOGY | Facility: CLINIC | Age: 25
End: 2025-05-21
Payer: COMMERCIAL

## 2025-05-21 LAB
17OHP SERPL-MCNC: 32 NG/DL
DHEA-S SERPL-MCNC: 257 MCG/DL (ref 14–349)
PROGEST SERPL-MCNC: 0.8 NG/ML
TESTOST FREE SERPL-MCNC: 3.4 PG/ML (ref 0.1–6.4)
TESTOST SERPL-MCNC: 19 NG/DL (ref 2–45)

## 2025-05-28 ENCOUNTER — APPOINTMENT (OUTPATIENT)
Dept: ENDOCRINOLOGY | Facility: CLINIC | Age: 25
End: 2025-05-28
Payer: COMMERCIAL

## 2025-05-28 LAB
COMMENTS - MP RESULT TYPE: NORMAL
SCAN RESULT: NORMAL

## 2025-09-03 ENCOUNTER — APPOINTMENT (OUTPATIENT)
Dept: ENDOCRINOLOGY | Facility: CLINIC | Age: 25
End: 2025-09-03
Payer: COMMERCIAL